# Patient Record
Sex: MALE | Race: WHITE | NOT HISPANIC OR LATINO | URBAN - METROPOLITAN AREA
[De-identification: names, ages, dates, MRNs, and addresses within clinical notes are randomized per-mention and may not be internally consistent; named-entity substitution may affect disease eponyms.]

---

## 2021-04-28 ENCOUNTER — WEB ENCOUNTER (OUTPATIENT)
Dept: URBAN - METROPOLITAN AREA CLINIC 13 | Facility: CLINIC | Age: 51
End: 2021-04-28

## 2021-05-12 ENCOUNTER — OFFICE VISIT (OUTPATIENT)
Dept: URBAN - NONMETROPOLITAN AREA SURGERY CENTER 1 | Facility: SURGERY CENTER | Age: 51
End: 2021-05-12
Payer: COMMERCIAL

## 2021-05-12 DIAGNOSIS — D12.8 ADENOMATOUS POLYP OF RECTUM: ICD-10-CM

## 2021-05-12 DIAGNOSIS — Z12.11 COLON CANCER SCREENING: ICD-10-CM

## 2021-05-12 DIAGNOSIS — D12.3 ADENOMA OF TRANSVERSE COLON: ICD-10-CM

## 2021-05-12 PROCEDURE — 45385 COLONOSCOPY W/LESION REMOVAL: CPT | Performed by: INTERNAL MEDICINE

## 2021-05-12 PROCEDURE — G8907 PT DOC NO EVENTS ON DISCHARG: HCPCS | Performed by: INTERNAL MEDICINE

## 2025-02-03 ENCOUNTER — LAB OUTSIDE AN ENCOUNTER (OUTPATIENT)
Dept: URBAN - NONMETROPOLITAN AREA SURGERY CENTER 1 | Facility: SURGERY CENTER | Age: 55
End: 2025-02-03

## 2025-02-07 ENCOUNTER — CLAIMS CREATED FROM THE CLAIM WINDOW (OUTPATIENT)
Dept: URBAN - NONMETROPOLITAN AREA SURGERY CENTER 1 | Facility: SURGERY CENTER | Age: 55
End: 2025-02-07
Payer: COMMERCIAL

## 2025-02-07 DIAGNOSIS — Z12.11 ENCOUNTER FOR COLONOSCOPY DUE TO HISTORY OF COLONIC POLYP: ICD-10-CM

## 2025-02-07 DIAGNOSIS — Z86.0100 HISTORY OF COLON POLYPS: ICD-10-CM

## 2025-02-07 DIAGNOSIS — Z86.0100 PERSONAL HISTORY OF COLON POLYPS, UNSPECIFIED: ICD-10-CM

## 2025-02-07 DIAGNOSIS — K63.89 OTHER SPECIFIED DISEASES OF INTESTINE: ICD-10-CM

## 2025-02-07 PROCEDURE — 45380 COLONOSCOPY AND BIOPSY: CPT | Performed by: INTERNAL MEDICINE

## 2025-02-07 PROCEDURE — 00812 ANES LWR INTST SCR COLSC: CPT | Performed by: NURSE ANESTHETIST, CERTIFIED REGISTERED

## 2025-02-26 ENCOUNTER — TELEPHONE ENCOUNTER (OUTPATIENT)
Dept: URBAN - METROPOLITAN AREA CLINIC 96 | Facility: CLINIC | Age: 55
End: 2025-02-26

## 2025-03-07 ENCOUNTER — LAB OUTSIDE AN ENCOUNTER (OUTPATIENT)
Dept: URBAN - METROPOLITAN AREA CLINIC 96 | Facility: CLINIC | Age: 55
End: 2025-03-07

## 2025-03-13 LAB — CALPROTECTIN, FECAL: 393

## 2025-03-22 ENCOUNTER — WEB ENCOUNTER (OUTPATIENT)
Dept: URBAN - NONMETROPOLITAN AREA CLINIC 2 | Facility: CLINIC | Age: 55
End: 2025-03-22

## 2025-03-22 RX ORDER — MESALAMINE 1.2 G/1
4 TABLETS WITH A MEAL TABLET, DELAYED RELEASE ORAL ONCE A DAY
Qty: 360 TABLET | Refills: 0 | OUTPATIENT
Start: 2025-03-24

## 2025-04-15 ENCOUNTER — LAB OUTSIDE AN ENCOUNTER (OUTPATIENT)
Dept: URBAN - NONMETROPOLITAN AREA CLINIC 2 | Facility: CLINIC | Age: 55
End: 2025-04-15

## 2025-04-15 ENCOUNTER — DASHBOARD ENCOUNTERS (OUTPATIENT)
Age: 55
End: 2025-04-15

## 2025-04-15 ENCOUNTER — OFFICE VISIT (OUTPATIENT)
Dept: URBAN - NONMETROPOLITAN AREA CLINIC 2 | Facility: CLINIC | Age: 55
End: 2025-04-15
Payer: COMMERCIAL

## 2025-04-15 DIAGNOSIS — K52.9 ILEITIS: ICD-10-CM

## 2025-04-15 DIAGNOSIS — Z86.0101 HISTORY OF ADENOMATOUS POLYP OF COLON: ICD-10-CM

## 2025-04-15 PROBLEM — 429047008: Status: ACTIVE | Noted: 2025-04-15

## 2025-04-15 PROBLEM — 52457000: Status: ACTIVE | Noted: 2025-04-15

## 2025-04-15 PROCEDURE — 99214 OFFICE O/P EST MOD 30 MIN: CPT | Performed by: INTERNAL MEDICINE

## 2025-04-15 RX ORDER — MESALAMINE 1.2 G/1
4 TABLETS WITH A MEAL TABLET, DELAYED RELEASE ORAL ONCE A DAY
Qty: 360 TABLET | Refills: 0 | Status: ACTIVE | COMMUNITY
Start: 2025-03-24

## 2025-04-15 NOTE — HPI-TODAY'S VISIT:
55 yo M  lives w wife. one kid who lives in [facility]. has had 2 DACs. at 2/7/2025 exam, noted to have ICV inflammation w unhelpful bxs results re a dx of CD or not. IBD panel negative.  fecal calprotectin very elevated. started on lialda. pt notes now dark urine. notes bms qd and nl. results and implications d/w pt and wife today. 03-Nov-2019 12:44

## 2025-04-16 LAB
A/G RATIO: 1.6
ABSOLUTE BASOPHILS: 42
ABSOLUTE EOSINOPHILS: 102
ABSOLUTE LYMPHOCYTES: 1440
ABSOLUTE MONOCYTES: 432
ABSOLUTE NEUTROPHILS: 3984
ALBUMIN: 4.4
ALKALINE PHOSPHATASE: 49
ALT (SGPT): 28
AST (SGOT): 16
BASOPHILS: 0.7
BILIRUBIN, TOTAL: 1.1
BUN/CREATININE RATIO: (no result)
BUN: 17
C-REACTIVE PROTEIN, QUANT: <3
CALCIUM: 9.7
CARBON DIOXIDE, TOTAL: 26
CHLORIDE: 104
CREATININE: 0.82
EGFR: 103
EOSINOPHILS: 1.7
GLOBULIN, TOTAL: 2.7
GLUCOSE: 98
HEMATOCRIT: 48.6
HEMOGLOBIN: 15.8
LYMPHOCYTES: 24
MCH: 29.6
MCHC: 32.5
MCV: 91.2
MONOCYTES: 7.2
MPV: 10.1
NEUTROPHILS: 66.4
PLATELET COUNT: 336
POTASSIUM: 4.7
PROTEIN, TOTAL: 7.1
RDW: 13.5
RED BLOOD CELL COUNT: 5.33
SED RATE BY MODIFIED: 2
SODIUM: 138
WHITE BLOOD CELL COUNT: 6

## 2025-04-26 LAB
CALPROTECTIN, FECAL: 27
FECAL FAT, QUALITATIVE: NORMAL

## 2025-05-07 ENCOUNTER — TELEPHONE ENCOUNTER (OUTPATIENT)
Dept: URBAN - NONMETROPOLITAN AREA CLINIC 2 | Facility: CLINIC | Age: 55
End: 2025-05-07

## 2025-07-09 ENCOUNTER — WEB ENCOUNTER (OUTPATIENT)
Dept: URBAN - NONMETROPOLITAN AREA CLINIC 2 | Facility: CLINIC | Age: 55
End: 2025-07-09